# Patient Record
Sex: FEMALE | Race: WHITE | NOT HISPANIC OR LATINO | Employment: OTHER | URBAN - METROPOLITAN AREA
[De-identification: names, ages, dates, MRNs, and addresses within clinical notes are randomized per-mention and may not be internally consistent; named-entity substitution may affect disease eponyms.]

---

## 2018-04-03 ENCOUNTER — IMPORTED ENCOUNTER (OUTPATIENT)
Dept: URBAN - METROPOLITAN AREA CLINIC 50 | Facility: CLINIC | Age: 58
End: 2018-04-03

## 2021-04-17 ASSESSMENT — VISUAL ACUITY
OD_SC: 20/30
OS_SC: 20/30

## 2021-04-17 ASSESSMENT — TONOMETRY
OS_IOP_MMHG: 18
OD_IOP_MMHG: 18

## 2021-05-11 NOTE — PATIENT DISCUSSION
Left message for patient to call back to schedule appointment with covering doctor or go to urgent care. If patient calls back please schedule her with covering doctor.    Recommend 3cc's of Juvederm Volume or RHA4 to cheeks/chin(discussed risks and benefits. .. ).

## 2021-05-11 NOTE — PATIENT DISCUSSION
Recommend 1cc's of JuvedPinchd Ultra XC (discussed risks and benefits. ..). Lot# [id number] Exp. 2022-04-13.

## 2022-04-25 ENCOUNTER — NEW PATIENT (OUTPATIENT)
Dept: URBAN - METROPOLITAN AREA CLINIC 52 | Facility: CLINIC | Age: 62
End: 2022-04-25

## 2022-04-25 DIAGNOSIS — H01.00A: ICD-10-CM

## 2022-04-25 DIAGNOSIS — L71.9: ICD-10-CM

## 2022-04-25 DIAGNOSIS — H02.883: ICD-10-CM

## 2022-04-25 DIAGNOSIS — H02.886: ICD-10-CM

## 2022-04-25 DIAGNOSIS — H01.00B: ICD-10-CM

## 2022-04-25 PROCEDURE — 92002 INTRM OPH EXAM NEW PATIENT: CPT

## 2022-04-25 RX ORDER — OLOPATADINE HYDROCHLORIDE 2 MG/ML: 1 SOLUTION OPHTHALMIC EVERY MORNING

## 2022-04-25 RX ORDER — BUTALBITAL, ASPIRIN, CAFFEINE AND CODEINE PHOSPHATE 30; 50; 40; 325 MG/1; MG/1; MG/1; MG/1: 1 CAPSULE ORAL TWICE A DAY

## 2022-04-25 ASSESSMENT — VISUAL ACUITY
OD_PH: 20/25-2
OD_CC: 20/30-2
OS_CC: 20/30-2

## 2022-04-25 ASSESSMENT — TONOMETRY
OS_IOP_MMHG: 15
OD_IOP_MMHG: 14

## 2022-05-16 ENCOUNTER — FOLLOW UP (OUTPATIENT)
Dept: URBAN - METROPOLITAN AREA CLINIC 52 | Facility: CLINIC | Age: 62
End: 2022-05-16

## 2022-05-16 DIAGNOSIS — H01.00B: ICD-10-CM

## 2022-05-16 DIAGNOSIS — L71.9: ICD-10-CM

## 2022-05-16 DIAGNOSIS — H01.00A: ICD-10-CM

## 2022-05-16 DIAGNOSIS — H02.883: ICD-10-CM

## 2022-05-16 DIAGNOSIS — H02.886: ICD-10-CM

## 2022-05-16 PROCEDURE — 92012 INTRM OPH EXAM EST PATIENT: CPT

## 2022-05-16 ASSESSMENT — VISUAL ACUITY
OD_CC: 20/30
OS_PH: 20/20
OU_CC: 20/30
OS_CC: 20/30-1
OD_PH: 20/20

## 2022-05-16 ASSESSMENT — TONOMETRY
OD_IOP_MMHG: 16
OS_IOP_MMHG: 16

## 2022-05-19 ENCOUNTER — COMPREHENSIVE EXAM (OUTPATIENT)
Dept: URBAN - METROPOLITAN AREA CLINIC 49 | Facility: CLINIC | Age: 62
End: 2022-05-19

## 2022-05-19 DIAGNOSIS — L71.9: ICD-10-CM

## 2022-05-19 DIAGNOSIS — H25.13: ICD-10-CM

## 2022-05-19 DIAGNOSIS — Z01.01: ICD-10-CM

## 2022-05-19 DIAGNOSIS — H53.2: ICD-10-CM

## 2022-05-19 PROCEDURE — 92015 DETERMINE REFRACTIVE STATE: CPT

## 2022-05-19 PROCEDURE — 92014 COMPRE OPH EXAM EST PT 1/>: CPT

## 2022-05-19 ASSESSMENT — VISUAL ACUITY
OD_CC: 20/30
OS_GLARE: 20/30-1
OS_GLARE: 20/30-1
OD_PH: 20/25-
OD_GLARE: 20/50-1
OS_CC: 20/40
OS_PH: 20/25-
OU_CC: J1+/-1
OD_GLARE: 20/40-1

## 2022-05-19 ASSESSMENT — TONOMETRY
OS_IOP_MMHG: 16
OD_IOP_MMHG: 16

## 2022-05-24 ENCOUNTER — CONTACT LENSES/GLASSES VISIT (OUTPATIENT)
Dept: URBAN - METROPOLITAN AREA CLINIC 53 | Facility: CLINIC | Age: 62
End: 2022-05-24

## 2022-05-24 DIAGNOSIS — H53.2: ICD-10-CM

## 2022-05-24 PROCEDURE — 92060 SENSORIMOTOR EXAMINATION: CPT

## 2022-05-24 ASSESSMENT — VISUAL ACUITY
OU_CC: 20/20 SLOW
OU_CC: J1+
OD_CC: 20/25-1
OS_CC: 20/40-2

## 2023-04-18 NOTE — PATIENT DISCUSSION
Recommend 64 units of Botox. Patient elects Botox injection. 36units discarded, 64 units used. (discussed risks and benefits of BOTOX. ..). [id number], Exp. 2023-08. 12

## 2025-01-08 ENCOUNTER — COMPREHENSIVE EXAM (OUTPATIENT)
Age: 65
End: 2025-01-08

## 2025-01-08 DIAGNOSIS — H01.00A: ICD-10-CM

## 2025-01-08 DIAGNOSIS — L71.9: ICD-10-CM

## 2025-01-08 DIAGNOSIS — H01.00B: ICD-10-CM

## 2025-01-08 PROCEDURE — 99214 OFFICE O/P EST MOD 30 MIN: CPT
